# Patient Record
Sex: MALE
[De-identification: names, ages, dates, MRNs, and addresses within clinical notes are randomized per-mention and may not be internally consistent; named-entity substitution may affect disease eponyms.]

---

## 2024-09-01 ENCOUNTER — NURSE TRIAGE (OUTPATIENT)
Dept: OTHER | Facility: CLINIC | Age: 29
End: 2024-09-01

## 2024-09-29 SDOH — HEALTH STABILITY: PHYSICAL HEALTH: ON AVERAGE, HOW MANY MINUTES DO YOU ENGAGE IN EXERCISE AT THIS LEVEL?: 0 MIN

## 2024-09-29 SDOH — HEALTH STABILITY: PHYSICAL HEALTH: ON AVERAGE, HOW MANY DAYS PER WEEK DO YOU ENGAGE IN MODERATE TO STRENUOUS EXERCISE (LIKE A BRISK WALK)?: 0 DAYS

## 2024-09-30 NOTE — PROGRESS NOTES
[facility] [place]  Office Note  Please note that this dictation was completed with [employer], the computer voice recognition software.  Quite often unanticipated grammatical, syntax, homophones, and other interpretive errors are inadvertently transcribed by the computer software.  Please disregard these errors.  Please excuse any errors that have escaped final proofreading.       History of present illness:Kingsley Sibley is a 29 y.o. male presenting for Annual Exam      New patient, no priors. 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  He does report primary care there.  He does not have acute concerns.  Recently treated at urgent care for ingrown toenail of left great toe.  He was prescribed antibiotics and has subsequently resolved.  Past medical history includes generalized anxiety, panic attack, psoriasis.  He reports significant family history of heart disease and various cancers    Hx LFT's elevated, resolved.     Derm  Made new patient appointment with dermatology for upcoming March  Hx Psoriasis, currently using topical ointments as needed.   Patches all over including genitals    Anxiety, hx panic attacks  On lexapro  Hx psychiatry, started after dad passed   Intermittent counseling  1 panic attack recently, prn propanolol for performance anxiety  Xanax for flights  Finished residency, working as infectious disease pharmacist at [employer]    PHQ-9 Total Score: 0 (10/2/2024  2:56 PM)     He would like flu vaccine today.    Fam hx:  Dad  age 52 MI, brother  from rare cancer at age 12 or 14  Cervical cancer, lung cancers- grandmothers  Dementia     Health maintenance  Vaccines up to date  Never had stress test  Declined STI testing  Social alcoholic drinks on the weekends, no tobacco products    Review of Systems   Constitutional: Negative.    Respiratory:  Negative for shortness of breath.    Cardiovascular:  Negative for

## 2024-10-02 ENCOUNTER — OFFICE VISIT (OUTPATIENT)
Facility: CLINIC | Age: 29
End: 2024-10-02

## 2024-10-02 VITALS
DIASTOLIC BLOOD PRESSURE: 82 MMHG | TEMPERATURE: 98.1 F | HEART RATE: 73 BPM | BODY MASS INDEX: 27.49 KG/M2 | WEIGHT: 192 LBS | RESPIRATION RATE: 16 BRPM | SYSTOLIC BLOOD PRESSURE: 122 MMHG | OXYGEN SATURATION: 98 % | HEIGHT: 70 IN

## 2024-10-02 DIAGNOSIS — Z23 ENCOUNTER FOR ADMINISTRATION OF VACCINE: ICD-10-CM

## 2024-10-02 DIAGNOSIS — F41.1 GENERALIZED ANXIETY DISORDER: ICD-10-CM

## 2024-10-02 DIAGNOSIS — L40.8 OTHER PSORIASIS: ICD-10-CM

## 2024-10-02 DIAGNOSIS — Z00.00 ANNUAL PHYSICAL EXAM: Primary | ICD-10-CM

## 2024-10-02 RX ORDER — BENZOCAINE/MENTHOL 6 MG-10 MG
1 LOZENGE MUCOUS MEMBRANE 2 TIMES DAILY
COMMUNITY

## 2024-10-02 RX ORDER — ESCITALOPRAM OXALATE 20 MG/1
20 TABLET ORAL DAILY
Qty: 90 TABLET | Refills: 1 | Status: SHIPPED | OUTPATIENT
Start: 2024-10-02 | End: 2025-03-31

## 2024-10-02 RX ORDER — ESCITALOPRAM OXALATE 20 MG/1
20 TABLET ORAL DAILY
COMMUNITY
End: 2024-10-02 | Stop reason: SDUPTHER

## 2024-10-02 RX ORDER — BETAMETHASONE DIPROPIONATE 0.5 MG/ML
1 LOTION, AUGMENTED TOPICAL DAILY
COMMUNITY
Start: 2014-09-29 | End: 2024-10-02

## 2024-10-02 RX ORDER — PROPRANOLOL HCL 20 MG
20 TABLET ORAL DAILY PRN
COMMUNITY

## 2024-10-02 RX ORDER — BETAMETHASONE DIPROPIONATE 0.5 MG/G
CREAM TOPICAL
Qty: 45 G | Refills: 0 | Status: SHIPPED | OUTPATIENT
Start: 2024-10-02 | End: 2024-11-01

## 2024-10-02 SDOH — ECONOMIC STABILITY: FOOD INSECURITY: WITHIN THE PAST 12 MONTHS, THE FOOD YOU BOUGHT JUST DIDN'T LAST AND YOU DIDN'T HAVE MONEY TO GET MORE.: NEVER TRUE

## 2024-10-02 SDOH — ECONOMIC STABILITY: FOOD INSECURITY: WITHIN THE PAST 12 MONTHS, YOU WORRIED THAT YOUR FOOD WOULD RUN OUT BEFORE YOU GOT MONEY TO BUY MORE.: NEVER TRUE

## 2024-10-02 SDOH — ECONOMIC STABILITY: INCOME INSECURITY: HOW HARD IS IT FOR YOU TO PAY FOR THE VERY BASICS LIKE FOOD, HOUSING, MEDICAL CARE, AND HEATING?: NOT HARD AT ALL

## 2024-10-02 ASSESSMENT — ENCOUNTER SYMPTOMS
NAUSEA: 0
DIARRHEA: 0
BACK PAIN: 0
BLOOD IN STOOL: 0
SHORTNESS OF BREATH: 0
VOMITING: 0

## 2024-10-02 ASSESSMENT — PATIENT HEALTH QUESTIONNAIRE - PHQ9
1. LITTLE INTEREST OR PLEASURE IN DOING THINGS: NOT AT ALL
SUM OF ALL RESPONSES TO PHQ QUESTIONS 1-9: 0
2. FEELING DOWN, DEPRESSED OR HOPELESS: NOT AT ALL
SUM OF ALL RESPONSES TO PHQ QUESTIONS 1-9: 0
SUM OF ALL RESPONSES TO PHQ9 QUESTIONS 1 & 2: 0
SUM OF ALL RESPONSES TO PHQ QUESTIONS 1-9: 0
SUM OF ALL RESPONSES TO PHQ QUESTIONS 1-9: 0

## 2024-10-02 NOTE — PROGRESS NOTES
Kingsley Sibley is a 29 y.o. male     Chief Complaint   Patient presents with    Annual Exam       /82 (Site: Left Upper Arm, Position: Sitting, Cuff Size: Medium Adult)   Pulse 73   Temp 98.1 °F (36.7 °C)   Resp 16   Ht 1.778 m (5' 10\")   Wt 87.1 kg (192 lb)   SpO2 98%   BMI 27.55 kg/m²     Health Maintenance Due   Topic Date Due    Depression Screen  Never done    Varicella vaccine (1 of 2 - 13+ 2-dose series) Never done    HIV screen  Never done    Hepatitis C screen  Never done    Hepatitis B vaccine (1 of 3 - 19+ 3-dose series) Never done    DTaP/Tdap/Td vaccine (1 - Tdap) Never done    Flu vaccine (1) Never done    COVID-19 Vaccine (1 - 2023-24 season) Never done         \"Have you been to the ER, urgent care clinic since your last visit?  Hospitalized since your last visit?\"    YES - When: approximately 1 months ago.  Where and Why: [facility].    “Have you seen or consulted any other health care providers outside of [facility] since your last visit?”    NO    Administered Flu Vaccine in left deltoid. Patient tolerated injection well.    Lot#:[id number]    Exp:June 17, 2025    NDC:08348-069-93

## 2025-01-08 ENCOUNTER — OFFICE VISIT (OUTPATIENT)
Age: 30
End: 2025-01-08

## 2025-01-08 VITALS
HEART RATE: 70 BPM | OXYGEN SATURATION: 96 % | HEIGHT: 70 IN | TEMPERATURE: 98.5 F | WEIGHT: 198.6 LBS | SYSTOLIC BLOOD PRESSURE: 132 MMHG | DIASTOLIC BLOOD PRESSURE: 84 MMHG | BODY MASS INDEX: 28.43 KG/M2

## 2025-01-08 DIAGNOSIS — L03.032 PARONYCHIA OF GREAT TOE OF LEFT FOOT: Primary | ICD-10-CM

## 2025-01-08 PROBLEM — S09.90XA CLOSED HEAD INJURY: Status: ACTIVE | Noted: 2023-12-21

## 2025-01-08 PROBLEM — R79.89 ELEVATED LFTS: Status: ACTIVE | Noted: 2023-09-19

## 2025-01-08 PROBLEM — F41.1 GENERALIZED ANXIETY DISORDER WITH PANIC ATTACKS: Status: ACTIVE | Noted: 2025-01-08

## 2025-01-08 PROBLEM — E80.4 GILBERT'S DISEASE: Status: ACTIVE | Noted: 2023-09-19

## 2025-01-08 PROBLEM — F41.0 GENERALIZED ANXIETY DISORDER WITH PANIC ATTACKS: Status: ACTIVE | Noted: 2025-01-08

## 2025-01-08 RX ORDER — ALPRAZOLAM 0.25 MG/1
0.25 TABLET ORAL NIGHTLY PRN
COMMUNITY

## 2025-01-08 RX ORDER — MUPIROCIN 20 MG/G
1 OINTMENT TOPICAL 2 TIMES DAILY
Qty: 22 G | Refills: 0 | Status: SHIPPED | OUTPATIENT
Start: 2025-01-08 | End: 2025-01-15

## 2025-01-08 RX ORDER — SULFAMETHOXAZOLE AND TRIMETHOPRIM 800; 160 MG/1; MG/1
1 TABLET ORAL 2 TIMES DAILY
Qty: 14 TABLET | Refills: 0 | Status: SHIPPED | OUTPATIENT
Start: 2025-01-08 | End: 2025-01-15

## 2025-01-08 NOTE — PATIENT INSTRUCTIONS
Recommend warm epsom salt soaks 1-2 times daily and wash area well with warm soapy water and pat dry.  Avoid tight toe shoes.  Make appointment with Podiatry for recurrent infection around toe nail.  Return to clinic as needed.

## 2025-01-08 NOTE — PROGRESS NOTES
Patient Name: Kingsley Sibley   YOB: 1995   Patient Status: New patient,   Chief Complaint: New Patient and Nail Problem (Infection on left toe between nail and nail bed. This has happened previously.)      ____________________________________________________________________________________________    External Records Reviewed: None    Limitation to History: None    Outside Historian: None    SUBJECTIVE/OBJECTIVE:  Kingsley Sibley is a 29 y.o. male presents with complaint of left large toe with swelling, redness and pus drainage around nail.  Symptoms began 3 day(s) ago and are worsening since onset.  He reports this is a recurrent infection. The patient denies fever or injury to toe. No other acute symptoms reported at this time.          PAST MEDICAL HISTORY:   Medical: Pt  has a past medical history of Anxiety.  Surgical: Pt  has no past surgical history on file.  Family: Pt family history includes Brain Cancer in his brother; Breast Cancer in his maternal aunt; Cervical Cancer in his paternal grandmother; Lung Cancer in his maternal grandmother.  Social: Pt   Social History     Socioeconomic History    Marital status: Single     Spouse name: Not on file    Number of children: Not on file    Years of education: Not on file    Highest education level: Not on file   Occupational History    Not on file   Tobacco Use    Smoking status: Never     Passive exposure: Never    Smokeless tobacco: Never   Vaping Use    Vaping status: Never Used   Substance and Sexual Activity    Alcohol use: Yes     Alcohol/week: 2.0 standard drinks of alcohol     Types: 2 Cans of beer per week    Drug use: Never    Sexual activity: Yes     Partners: Female   Other Topics Concern    Not on file   Social History Narrative    Not on file     Social Determinants of Health     Financial Resource Strain: Low Risk  (10/2/2024)    Overall Financial Resource Strain (CARDIA)     Difficulty of Paying Living Expenses: Not hard at all   Food

## 2025-06-18 DIAGNOSIS — F41.1 GENERALIZED ANXIETY DISORDER: ICD-10-CM

## 2025-06-19 RX ORDER — ESCITALOPRAM OXALATE 20 MG/1
20 TABLET ORAL DAILY
Qty: 30 TABLET | Refills: 0 | Status: SHIPPED | OUTPATIENT
Start: 2025-06-19

## 2025-06-19 NOTE — TELEPHONE ENCOUNTER
PCP: Mateusz Rios, LILLI - NP    Last appt: 10/2/2024 Overdue for 6 month f/u (April 2025)    No future appointments.    Requested Prescriptions     Pending Prescriptions Disp Refills    escitalopram (LEXAPRO) 20 MG tablet [Pharmacy Med Name: ESCITALOPRAM OXALATE 20MG TABS] 180 tablet 0     Sig: TAKE ONE TABLET BY MOUTH ONCE A DAY

## 2025-07-25 DIAGNOSIS — F41.1 GENERALIZED ANXIETY DISORDER: ICD-10-CM

## 2025-07-25 NOTE — TELEPHONE ENCOUNTER
PCP: Mateusz Rios APRN - NP    Last appt: 10/2/2024    No future appointments.    Requested Prescriptions     Pending Prescriptions Disp Refills    escitalopram (LEXAPRO) 20 MG tablet 90 tablet 0     Sig: Take 1 tablet by mouth daily

## 2025-07-25 NOTE — TELEPHONE ENCOUNTER
Please send a 90 day supply to [facility] [place]. Will call patient and schedule a 6 month follow up     escitalopram (LEXAPRO) 20 MG tablet

## 2025-07-28 RX ORDER — ESCITALOPRAM OXALATE 20 MG/1
20 TABLET ORAL DAILY
Qty: 90 TABLET | Refills: 0 | Status: SHIPPED | OUTPATIENT
Start: 2025-07-28

## 2025-07-28 NOTE — TELEPHONE ENCOUNTER
I will send this refill request.  He is due at least for his physical October 3rd.  I will not be able to provide additional refills until I see him again